# Patient Record
Sex: MALE | Race: OTHER | Employment: UNEMPLOYED | URBAN - METROPOLITAN AREA
[De-identification: names, ages, dates, MRNs, and addresses within clinical notes are randomized per-mention and may not be internally consistent; named-entity substitution may affect disease eponyms.]

---

## 2024-08-28 ENCOUNTER — APPOINTMENT (EMERGENCY)
Dept: CT IMAGING | Facility: HOSPITAL | Age: 44
End: 2024-08-28

## 2024-08-28 ENCOUNTER — HOSPITAL ENCOUNTER (EMERGENCY)
Facility: HOSPITAL | Age: 44
Discharge: HOME | End: 2024-08-28
Attending: EMERGENCY MEDICINE

## 2024-08-28 VITALS
HEART RATE: 75 BPM | DIASTOLIC BLOOD PRESSURE: 57 MMHG | RESPIRATION RATE: 18 BRPM | SYSTOLIC BLOOD PRESSURE: 107 MMHG | TEMPERATURE: 97.9 F | OXYGEN SATURATION: 99 %

## 2024-08-28 DIAGNOSIS — N20.1 LEFT URETERAL STONE: Primary | ICD-10-CM

## 2024-08-28 LAB
ALBUMIN SERPL BCG-MCNC: 4.5 G/DL (ref 3.5–5)
ALP SERPL-CCNC: 72 U/L (ref 34–104)
ALT SERPL W P-5'-P-CCNC: 45 U/L (ref 7–52)
AMORPH URATE CRY URNS QL MICRO: ABNORMAL /HPF
ANION GAP SERPL CALCULATED.3IONS-SCNC: 13 MMOL/L (ref 4–13)
AST SERPL W P-5'-P-CCNC: 26 U/L (ref 13–39)
BACTERIA UR QL AUTO: ABNORMAL /HPF
BASOPHILS # BLD AUTO: 0.04 THOUSANDS/ÂΜL (ref 0–0.1)
BASOPHILS NFR BLD AUTO: 1 % (ref 0–1)
BILIRUB SERPL-MCNC: 1.04 MG/DL (ref 0.2–1)
BILIRUB UR QL STRIP: ABNORMAL
BUN SERPL-MCNC: 20 MG/DL (ref 5–25)
CALCIUM SERPL-MCNC: 9.9 MG/DL (ref 8.4–10.2)
CAOX CRY URNS QL MICRO: ABNORMAL /HPF
CHLORIDE SERPL-SCNC: 104 MMOL/L (ref 96–108)
CLARITY UR: ABNORMAL
CO2 SERPL-SCNC: 25 MMOL/L (ref 21–32)
COLOR UR: ABNORMAL
CREAT SERPL-MCNC: 1.27 MG/DL (ref 0.6–1.3)
EOSINOPHIL # BLD AUTO: 0.03 THOUSAND/ÂΜL (ref 0–0.61)
EOSINOPHIL NFR BLD AUTO: 0 % (ref 0–6)
ERYTHROCYTE [DISTWIDTH] IN BLOOD BY AUTOMATED COUNT: 12.6 % (ref 11.6–15.1)
GFR SERPL CREATININE-BSD FRML MDRD: 67 ML/MIN/1.73SQ M
GLUCOSE SERPL-MCNC: 121 MG/DL (ref 65–140)
GLUCOSE UR STRIP-MCNC: NEGATIVE MG/DL
HCT VFR BLD AUTO: 49.1 % (ref 36.5–49.3)
HGB BLD-MCNC: 17.4 G/DL (ref 12–17)
HGB UR QL STRIP.AUTO: ABNORMAL
IMM GRANULOCYTES # BLD AUTO: 0.03 THOUSAND/UL (ref 0–0.2)
IMM GRANULOCYTES NFR BLD AUTO: 0 % (ref 0–2)
KETONES UR STRIP-MCNC: ABNORMAL MG/DL
LEUKOCYTE ESTERASE UR QL STRIP: NEGATIVE
LIPASE SERPL-CCNC: 10 U/L (ref 11–82)
LYMPHOCYTES # BLD AUTO: 2.48 THOUSANDS/ÂΜL (ref 0.6–4.47)
LYMPHOCYTES NFR BLD AUTO: 31 % (ref 14–44)
MCH RBC QN AUTO: 33 PG (ref 26.8–34.3)
MCHC RBC AUTO-ENTMCNC: 35.4 G/DL (ref 31.4–37.4)
MCV RBC AUTO: 93 FL (ref 82–98)
MONOCYTES # BLD AUTO: 0.53 THOUSAND/ÂΜL (ref 0.17–1.22)
MONOCYTES NFR BLD AUTO: 7 % (ref 4–12)
MUCOUS THREADS UR QL AUTO: ABNORMAL
NEUTROPHILS # BLD AUTO: 5 THOUSANDS/ÂΜL (ref 1.85–7.62)
NEUTS SEG NFR BLD AUTO: 61 % (ref 43–75)
NITRITE UR QL STRIP: NEGATIVE
NON-SQ EPI CELLS URNS QL MICRO: ABNORMAL /HPF
NRBC BLD AUTO-RTO: 0 /100 WBCS
PH UR STRIP.AUTO: 6 [PH]
PLATELET # BLD AUTO: 187 THOUSANDS/UL (ref 149–390)
PMV BLD AUTO: 10.6 FL (ref 8.9–12.7)
POTASSIUM SERPL-SCNC: 3.8 MMOL/L (ref 3.5–5.3)
PROT SERPL-MCNC: 7.9 G/DL (ref 6.4–8.4)
PROT UR STRIP-MCNC: ABNORMAL MG/DL
RBC # BLD AUTO: 5.28 MILLION/UL (ref 3.88–5.62)
RBC #/AREA URNS AUTO: ABNORMAL /HPF
SODIUM SERPL-SCNC: 142 MMOL/L (ref 135–147)
SP GR UR STRIP.AUTO: >=1.03 (ref 1–1.03)
UROBILINOGEN UR QL STRIP.AUTO: 0.2 E.U./DL
WBC # BLD AUTO: 8.11 THOUSAND/UL (ref 4.31–10.16)
WBC #/AREA URNS AUTO: ABNORMAL /HPF

## 2024-08-28 PROCEDURE — 80053 COMPREHEN METABOLIC PANEL: CPT

## 2024-08-28 PROCEDURE — 99285 EMERGENCY DEPT VISIT HI MDM: CPT | Performed by: EMERGENCY MEDICINE

## 2024-08-28 PROCEDURE — 96361 HYDRATE IV INFUSION ADD-ON: CPT

## 2024-08-28 PROCEDURE — 85025 COMPLETE CBC W/AUTO DIFF WBC: CPT

## 2024-08-28 PROCEDURE — 83690 ASSAY OF LIPASE: CPT

## 2024-08-28 PROCEDURE — 81001 URINALYSIS AUTO W/SCOPE: CPT | Performed by: EMERGENCY MEDICINE

## 2024-08-28 PROCEDURE — 96375 TX/PRO/DX INJ NEW DRUG ADDON: CPT

## 2024-08-28 PROCEDURE — 74176 CT ABD & PELVIS W/O CONTRAST: CPT

## 2024-08-28 PROCEDURE — 99284 EMERGENCY DEPT VISIT MOD MDM: CPT

## 2024-08-28 PROCEDURE — 36415 COLL VENOUS BLD VENIPUNCTURE: CPT

## 2024-08-28 PROCEDURE — 93005 ELECTROCARDIOGRAM TRACING: CPT

## 2024-08-28 PROCEDURE — 87086 URINE CULTURE/COLONY COUNT: CPT | Performed by: EMERGENCY MEDICINE

## 2024-08-28 PROCEDURE — 81003 URINALYSIS AUTO W/O SCOPE: CPT | Performed by: EMERGENCY MEDICINE

## 2024-08-28 PROCEDURE — 96374 THER/PROPH/DIAG INJ IV PUSH: CPT

## 2024-08-28 RX ORDER — OXYCODONE HYDROCHLORIDE 5 MG/1
5 TABLET ORAL EVERY 6 HOURS PRN
Qty: 8 TABLET | Refills: 0 | Status: SHIPPED | OUTPATIENT
Start: 2024-08-28 | End: 2024-08-31

## 2024-08-28 RX ORDER — MORPHINE SULFATE 4 MG/ML
4 INJECTION, SOLUTION INTRAMUSCULAR; INTRAVENOUS ONCE
Status: COMPLETED | OUTPATIENT
Start: 2024-08-28 | End: 2024-08-28

## 2024-08-28 RX ORDER — TAMSULOSIN HYDROCHLORIDE 0.4 MG/1
0.4 CAPSULE ORAL DAILY
Qty: 5 CAPSULE | Refills: 0 | Status: SHIPPED | OUTPATIENT
Start: 2024-08-29 | End: 2024-09-03

## 2024-08-28 RX ORDER — KETOROLAC TROMETHAMINE 30 MG/ML
30 INJECTION, SOLUTION INTRAMUSCULAR; INTRAVENOUS ONCE
Status: COMPLETED | OUTPATIENT
Start: 2024-08-28 | End: 2024-08-28

## 2024-08-28 RX ORDER — TAMSULOSIN HYDROCHLORIDE 0.4 MG/1
0.4 CAPSULE ORAL ONCE
Status: COMPLETED | OUTPATIENT
Start: 2024-08-28 | End: 2024-08-28

## 2024-08-28 RX ORDER — ONDANSETRON 4 MG/1
4 TABLET, ORALLY DISINTEGRATING ORAL ONCE
Status: COMPLETED | OUTPATIENT
Start: 2024-08-28 | End: 2024-08-28

## 2024-08-28 RX ADMIN — ONDANSETRON 4 MG: 4 TABLET, ORALLY DISINTEGRATING ORAL at 19:17

## 2024-08-28 RX ADMIN — MORPHINE SULFATE 4 MG: 4 INJECTION INTRAVENOUS at 19:04

## 2024-08-28 RX ADMIN — SODIUM CHLORIDE 1000 ML: 0.9 INJECTION, SOLUTION INTRAVENOUS at 19:04

## 2024-08-28 RX ADMIN — TAMSULOSIN HYDROCHLORIDE 0.4 MG: 0.4 CAPSULE ORAL at 21:50

## 2024-08-28 RX ADMIN — KETOROLAC TROMETHAMINE 30 MG: 30 INJECTION, SOLUTION INTRAMUSCULAR at 21:15

## 2024-08-28 NOTE — ED PROVIDER NOTES
History  Chief Complaint   Patient presents with    Abdominal Pain     Pt complains of sharp pain in left side that started three hours ago. Complains of nausea and vomiting.      45 yo M presents to the ED for left- sided abdominal pain that is non-radiating with a sudden onset 3 hours prior to arrival.  He states the pain pain in nature and has been constant.  He states to have increased sweating, vomited 4 times and is nauseous.  He denied fevers, chills, diarrhea, constipation, incontinence. Pertinent hx includes a kidney stone last year that resolved with pain medication.  used to obtain history and physical exam.       History provided by:  Patient   used: Yes    Abdominal Pain  Pain location: left side abd pain.  Pain radiates to:  Does not radiate  Pain severity:  Severe  Onset quality:  Sudden  Duration:  3 hours  Timing:  Constant  Progression:  Worsening  Chronicity:  New  Relieved by:  Nothing  Associated symptoms: nausea and vomiting    Associated symptoms: no chest pain, no chills, no constipation, no diarrhea, no dysuria, no fatigue, no fever and no shortness of breath        None       History reviewed. No pertinent past medical history.    History reviewed. No pertinent surgical history.    History reviewed. No pertinent family history.  I have reviewed and agree with the history as documented.    E-Cigarette/Vaping     E-Cigarette/Vaping Substances     Social History     Tobacco Use    Smoking status: Never    Smokeless tobacco: Never   Substance Use Topics    Alcohol use: Never    Drug use: Never        Review of Systems   Constitutional:  Positive for diaphoresis. Negative for chills, fatigue and fever.   Respiratory:  Negative for shortness of breath.    Cardiovascular:  Negative for chest pain and palpitations.   Gastrointestinal:  Positive for abdominal pain, nausea and vomiting. Negative for constipation and diarrhea.   Genitourinary:  Negative for  dysuria.   Musculoskeletal:  Negative for back pain.   Neurological:  Negative for dizziness, light-headedness and numbness.       Physical Exam  ED Triage Vitals [08/28/24 1838]   Temperature Pulse Respirations Blood Pressure SpO2   97.9 °F (36.6 °C) 66 20 139/97 99 %      Temp Source Heart Rate Source Patient Position - Orthostatic VS BP Location FiO2 (%)   Oral Monitor Lying Right arm --      Pain Score       8             Orthostatic Vital Signs  Vitals:    08/28/24 1838 08/28/24 2241   BP: 139/97 107/57   Pulse: 66 75   Patient Position - Orthostatic VS: Lying Lying       Physical Exam  Vitals and nursing note reviewed.   Constitutional:       General: He is in acute distress.      Appearance: He is well-developed. He is not ill-appearing or toxic-appearing.   HENT:      Head: Normocephalic and atraumatic.      Mouth/Throat:      Mouth: Mucous membranes are moist.   Eyes:      Extraocular Movements: Extraocular movements intact.      Pupils: Pupils are equal, round, and reactive to light.   Cardiovascular:      Rate and Rhythm: Normal rate and regular rhythm.      Heart sounds: Normal heart sounds.   Pulmonary:      Effort: Pulmonary effort is normal.      Breath sounds: Normal breath sounds. No wheezing.   Abdominal:      General: Abdomen is flat. There is no distension.      Tenderness: There is abdominal tenderness. There is no right CVA tenderness or left CVA tenderness.       Musculoskeletal:         General: No swelling.      Right lower leg: No edema.      Left lower leg: No edema.   Skin:     General: Skin is warm and dry.      Capillary Refill: Capillary refill takes less than 2 seconds.      Coloration: Skin is not jaundiced.   Neurological:      Mental Status: He is alert.         ED Medications  Medications   morphine injection 4 mg (4 mg Intravenous Given 8/28/24 1904)   sodium chloride 0.9 % bolus 1,000 mL (0 mL Intravenous Stopped 8/28/24 2115)   ondansetron (ZOFRAN-ODT) dispersible tablet 4 mg  (4 mg Oral Given 8/28/24 1917)   ketorolac (TORADOL) injection 30 mg (30 mg Intravenous Given 8/28/24 2115)   tamsulosin (FLOMAX) capsule 0.4 mg (0.4 mg Oral Given 8/28/24 2150)       Diagnostic Studies  Results Reviewed       Procedure Component Value Units Date/Time    UA (URINE) with reflex to Scope [[id number]]  (Abnormal) Collected: 08/28/24 2234    Lab Status: Final result Specimen: Urine, Clean Catch Updated: 08/28/24 2245     Color, UA Margaret     Clarity, UA Cloudy     Specific Gravity, UA >=1.030     pH, UA 6.0     Leukocytes, UA Negative     Nitrite, UA Negative     Protein, UA 2+ mg/dl      Glucose, UA Negative mg/dl      Ketones, UA 40 (2+) mg/dl      Urobilinogen, UA 0.2 E.U./dl      Bilirubin, UA 1+     Occult Blood, UA 3+    Urine Microscopic [[id number]] Collected: 08/28/24 2234    Lab Status: In process Specimen: Urine, Clean Catch Updated: 08/28/24 2245    Urine culture [[id number]] Collected: 08/28/24 2234    Lab Status: In process Specimen: Urine, Clean Catch Updated: 08/28/24 2239    Lipase [[id number]]  (Abnormal) Collected: 08/28/24 1857    Lab Status: Final result Specimen: Blood from Arm, Left Updated: 08/28/24 1932     Lipase 10 u/L     Comprehensive metabolic panel [[id number]]  (Abnormal) Collected: 08/28/24 1857    Lab Status: Final result Specimen: Blood from Arm, Left Updated: 08/28/24 1932     Sodium 142 mmol/L      Potassium 3.8 mmol/L      Chloride 104 mmol/L      CO2 25 mmol/L      ANION GAP 13 mmol/L      BUN 20 mg/dL      Creatinine 1.27 mg/dL      Glucose 121 mg/dL      Calcium 9.9 mg/dL      AST 26 U/L      ALT 45 U/L      Alkaline Phosphatase 72 U/L      Total Protein 7.9 g/dL      Albumin 4.5 g/dL      Total Bilirubin 1.04 mg/dL      eGFR 67 ml/min/1.73sq m     Narrative:      National Kidney Disease Foundation guidelines for Chronic Kidney Disease (CKD):     Stage 1 with normal or high GFR (GFR > 90 mL/min/1.73 square meters)    Stage 2 Mild CKD (GFR = 60-89 mL/min/1.73 square  meters)    Stage 3A Moderate CKD (GFR = 45-59 mL/min/1.73 square meters)    Stage 3B Moderate CKD (GFR = 30-44 mL/min/1.73 square meters)    Stage 4 Severe CKD (GFR = 15-29 mL/min/1.73 square meters)    Stage 5 End Stage CKD (GFR <15 mL/min/1.73 square meters)  Note: GFR calculation is accurate only with a steady state creatinine    CBC and differential [[id number]]  (Abnormal) Collected: 08/28/24 1857    Lab Status: Final result Specimen: Blood from Arm, Left Updated: 08/28/24 1910     WBC 8.11 Thousand/uL      RBC 5.28 Million/uL      Hemoglobin 17.4 g/dL      Hematocrit 49.1 %      MCV 93 fL      MCH 33.0 pg      MCHC 35.4 g/dL      RDW 12.6 %      MPV 10.6 fL      Platelets 187 Thousands/uL      nRBC 0 /100 WBCs      Segmented % 61 %      Immature Grans % 0 %      Lymphocytes % 31 %      Monocytes % 7 %      Eosinophils Relative 0 %      Basophils Relative 1 %      Absolute Neutrophils 5.00 Thousands/µL      Absolute Immature Grans 0.03 Thousand/uL      Absolute Lymphocytes 2.48 Thousands/µL      Absolute Monocytes 0.53 Thousand/µL      Eosinophils Absolute 0.03 Thousand/µL      Basophils Absolute 0.04 Thousands/µL                    CT renal stone study abdomen pelvis without contrast   Final Result by Liam Ugalde MD (08/28 2017)      5 mm calculus at the left UVJ causing mild hydronephrosis.      Bilateral subcentimeter nonobstructing intrarenal calculi.         Workstation performed: [id number]               Procedures  ECG 12 Lead Documentation Only    Date/Time: 8/28/2024 7:14 PM    Performed by: Thalia Trejo DO  Authorized by: Thalia Trejo DO    ECG reviewed by me, the ED Provider: yes    Patient location:  ED  Interpretation:     Interpretation: normal    Rate:     ECG rate assessment: normal    Rhythm:     Rhythm: sinus rhythm          ED Course       SBIRT 22yo+      Flowsheet Row Most Recent Value   Initial Alcohol Screen: US AUDIT-C     1. How often do you have a drink containing alcohol? 0  Filed at: 08/28/2024 1837   2. How many drinks containing alcohol do you have on a typical day you are drinking?  0 Filed at: 08/28/2024 1837   3a. Male UNDER 65: How often do you have five or more drinks on one occasion? 0 Filed at: 08/28/2024 1837   Audit-C Score 0 Filed at: 08/28/2024 1837   SARAI: How many times in the past year have you...    Used an illegal drug or used a prescription medication for non-medical reasons? Never Filed at: 08/28/2024 1837                  Medical Decision Making  43 yo M presents to the ED for left- sided abdominal pain that is non-radiating with a sudden onset 3 hours prior to arrival.  Differential dx include nephrolithiasis vs pyelonephritis vs intestinal obstruction.  ED work up included EKG that was NSR 69, labs, CT abd/pelv showed 5 mm calculus at the left UVJ causing mild hydronephrosis and bilateral subcentimeter nonobstructing intrarenal calculi. UA was not concerning for UTI.  Pt was provided w/ Morphine and Toradol for pain control, zofran, bolus of fluids, along with one dose of flomax.  He is feeling much better and is medically stable for discharge.  Provided pt with ambulatory referral to urology, prescription of flomax and roxicodone for pain control.         Amount and/or Complexity of Data Reviewed  Labs: ordered. Decision-making details documented in ED Course.  Radiology: ordered and independent interpretation performed. Decision-making details documented in ED Course.  ECG/medicine tests: ordered and independent interpretation performed. Decision-making details documented in ED Course.    Risk  Prescription drug management.      Disposition  Final diagnoses:   Left ureteral stone     Time reflects when diagnosis was documented in both MDM as applicable and the Disposition within this note       Time User Action Codes Description Comment    8/28/2024  8:58 PM Thalia Trejo Add [N20.0] Nephrolithiasis     8/28/2024  9:37 PM Eddie Rubio Add [N20.1] Left  ureteral stone     8/28/2024  9:37 PM Eddie Rubio Modify [N20.1] Left ureteral stone     8/28/2024  9:37 PM Eddie Rubio Remove [N20.0] Nephrolithiasis           ED Disposition       ED Disposition   Discharge    Condition   Stable    Date/Time   Wed Aug 28, 2024 2100    Comment   Daniel Wells discharge to home/self care.                   Follow-up Information       Follow up With Specialties Details Why Contact Info Additional Information    [facility] [place] [facility] Call in 1 day  [address] [facility] [address]  [facility] [address] [phone] [facility] [place], [address] [place] [address], [address] [phone]    Your primary doctor  Call in 1 day       [facility] Call  As needed [address] [facility] [address] [phone] [facility], [address] [place] [address], [address] [phone]            Patient's Medications   Discharge Prescriptions    OXYCODONE (ROXICODONE) 5 IMMEDIATE RELEASE TABLET    Take 1 tablet (5 mg total) by mouth every 6 (six) hours as needed for moderate pain or severe pain for up to 3 days Max Daily Amount: 20 mg       Start Date: 8/28/2024 End Date: 8/31/2024       Order Dose: 5 mg       Quantity: 8 tablet    Refills: 0    TAMSULOSIN (FLOMAX) 0.4 MG    Take 1 capsule (0.4 mg total) by mouth in the morning for 5 days Do not start before August 29, 2024.       Start Date: 8/29/2024 End Date: 9/3/2024       Order Dose: 0.4 mg       Quantity: 5 capsule    Refills: 0         PDMP Review         Value Time User    PDMP Reviewed  Yes 8/28/2024  9:58 PM Eddie Rubio MD           ED Provider  Attending physically available and evaluated Daniel OrtezJonnkarlos. I managed the patient along with the ED Attending.    Electronically Signed by     Thalia Trejo DO  08/28/24 2110       Thalia Trejo DO  08/28/24 2118       Thalia  Neil,   08/28/24 2302       Thalia Trejo,   08/28/24 2304       Thalia Trejo,   08/28/24 2304

## 2024-08-29 NOTE — DISCHARGE INSTRUCTIONS
Follow up with urology/outpatient providers, and return to the emergency department for new or worsening symptoms.      CT ABDOMEN AND PELVIS WITHOUT IV CONTRAST - LOW DOSE RENAL STONE     INDICATION: abd pain.     COMPARISON: None.     TECHNIQUE: Low radiation dose thin section CT examination of the abdomen and pelvis was performed without intravenous or oral contrast according to a protocol specifically designed to evaluate for urinary tract calculus. Axial, sagittal, and coronal 2D   reformatted images were created from the source data and submitted for interpretation. Evaluation for pathology in the abdomen and pelvis that is unrelated to urinary tract calculi is limited.     Radiation dose length product (DLP) for this visit: 197.3 mGy-cm . This examination, like all CT scans performed in the [facility], was performed utilizing techniques to minimize radiation dose exposure, including the use of iterative   reconstruction and automated exposure control.     URINARY TRACT FINDINGS:     RIGHT KIDNEY AND URETER: There is a 2 mm nonobstructing lower pole calculus. No hydronephrosis or hydroureter.     LEFT KIDNEY AND URETER: There is mild hydronephrosis and hydroureter, the cause of which appears to be a 5 mm calculus at the UVJ. There are multiple nonobstructing intrarenal calculi measuring up to 3 mm.     URINARY BLADDER: Unremarkable.        ADDITIONAL FINDINGS:     LOWER CHEST: No clinically significant abnormality in the visualized lower chest.     SOLID VISCERA: Hepatic steatosis. Otherwise, limited low radiation dose CT demonstrates no clinically significant abnormality of visualized solid abdominal viscera.     GALLBLADDER/BILIARY TREE: No calcified gallstones. No pericholecystic inflammatory change. No biliary dilation.     STOMACH AND BOWEL: Unremarkable.     APPENDIX: No findings to suggest appendicitis.     ABDOMINOPELVIC CAVITY: No ascites. No pneumoperitoneum. No lymphadenopathy.      VESSELS: Unremarkable for patient's age.     REPRODUCTIVE ORGANS: Unremarkable for patient's age.     ABDOMINAL WALL/INGUINAL REGIONS: Small fat-containing umbilical hernia.     BONES: No acute fracture or suspicious osseous lesion.     IMPRESSION:     5 mm calculus at the left UVJ causing mild hydronephrosis.

## 2024-08-29 NOTE — ED ATTENDING ATTESTATION
8/28/2024  I, Eddie Rasheed DO, saw and evaluated the patient. I have discussed the patient with the resident/non-physician practitioner and agree with the resident's/non-physician practitioner's findings, Plan of Care, and MDM as documented in the resident's/non-physician practitioner's note, except where noted. All available labs and Radiology studies were reviewed.  I was present for key portions of any procedure(s) performed by the resident/non-physician practitioner and I was immediately available to provide assistance.       At this point I agree with the current assessment done in the Emergency Department.  I have conducted an independent evaluation of this patient a history and physical is as follows:  44 year old male who presented to the ED with abdominal pain. Severe. Feels similar to prior kidney stone. No abdominal pain or flank tenderness. Appears very uncomfortable. Had a CT with a UVJ stone. On re-eval appears much more comfortable. Likely passed stone. Labs unremarkable. Will dc with follow up to urology.   ED Course         Critical Care Time  ECG 12 Lead Documentation Only    Date/Time: 8/28/2024 9:04 PM    Performed by: Eddie Rasheed DO  Authorized by: Eddie Rasheed DO    ECG reviewed by me, the ED Provider: yes    Patient location:  ED  Comments:      Normal sinus rhythm, rate of 69, normal NM, normal QTc, no STEMI, no prior EKG, EKG independently interpreted by me

## 2024-08-29 NOTE — ED CARE HANDOFF
Emergency Department Sign Out Note        Signout from my colleague, Dr. Trejo. See Separate Emergency Department note.     The patient, Daniel Wells, was evaluated for flank pain.    Labs Reviewed   COMPREHENSIVE METABOLIC PANEL - Abnormal       Result Value Ref Range Status    Sodium 142  135 - 147 mmol/L Final    Potassium 3.8  3.5 - 5.3 mmol/L Final    Chloride 104  96 - 108 mmol/L Final    CO2 25  21 - 32 mmol/L Final    ANION GAP 13  4 - 13 mmol/L Final    BUN 20  5 - 25 mg/dL Final    Creatinine 1.27  0.60 - 1.30 mg/dL Final    Comment: Standardized to IDMS reference method    Glucose 121  65 - 140 mg/dL Final    Comment: If the patient is fasting, the ADA then defines impaired fasting glucose as > 100 mg/dL and diabetes as > or equal to 123 mg/dL.    Calcium 9.9  8.4 - 10.2 mg/dL Final    AST 26  13 - 39 U/L Final    ALT 45  7 - 52 U/L Final    Comment: Specimen collection should occur prior to Sulfasalazine administration due to the potential for falsely depressed results.     Alkaline Phosphatase 72  34 - 104 U/L Final    Total Protein 7.9  6.4 - 8.4 g/dL Final    Albumin 4.5  3.5 - 5.0 g/dL Final    Total Bilirubin 1.04 (*) 0.20 - 1.00 mg/dL Final    Comment: Use of this assay is not recommended for patients undergoing treatment with eltrombopag due to the potential for falsely elevated results.  N-acetyl-p-benzoquinone imine (metabolite of Acetaminophen) will generate erroneously low results in samples for patients that have taken an overdose of Acetaminophen.    eGFR 67  ml/min/1.73sq m Final    Narrative:     National Kidney Disease Foundation guidelines for Chronic Kidney Disease (CKD):     Stage 1 with normal or high GFR (GFR > 90 mL/min/1.73 square meters)    Stage 2 Mild CKD (GFR = 60-89 mL/min/1.73 square meters)    Stage 3A Moderate CKD (GFR = 45-59 mL/min/1.73 square meters)    Stage 3B Moderate CKD (GFR = 30-44 mL/min/1.73 square meters)    Stage 4 Severe CKD (GFR = 15-29 mL/min/1.73  square meters)    Stage 5 End Stage CKD (GFR <15 mL/min/1.73 square meters)  Note: GFR calculation is accurate only with a steady state creatinine   LIPASE - Abnormal    Lipase 10 (*) 11 - 82 u/L Final   CBC AND DIFFERENTIAL - Abnormal    WBC 8.11  4.31 - 10.16 Thousand/uL Final    RBC 5.28  3.88 - 5.62 Million/uL Final    Hemoglobin 17.4 (*) 12.0 - 17.0 g/dL Final    Hematocrit 49.1  36.5 - 49.3 % Final    MCV 93  82 - 98 fL Final    MCH 33.0  26.8 - 34.3 pg Final    MCHC 35.4  31.4 - 37.4 g/dL Final    RDW 12.6  11.6 - 15.1 % Final    MPV 10.6  8.9 - 12.7 fL Final    Platelets 187  149 - 390 Thousands/uL Final    nRBC 0  /100 WBCs Final    Segmented % 61  43 - 75 % Final    Immature Grans % 0  0 - 2 % Final    Lymphocytes % 31  14 - 44 % Final    Monocytes % 7  4 - 12 % Final    Eosinophils Relative 0  0 - 6 % Final    Basophils Relative 1  0 - 1 % Final    Absolute Neutrophils 5.00  1.85 - 7.62 Thousands/µL Final    Absolute Immature Grans 0.03  0.00 - 0.20 Thousand/uL Final    Absolute Lymphocytes 2.48  0.60 - 4.47 Thousands/µL Final    Absolute Monocytes 0.53  0.17 - 1.22 Thousand/µL Final    Eosinophils Absolute 0.03  0.00 - 0.61 Thousand/µL Final    Basophils Absolute 0.04  0.00 - 0.10 Thousands/µL Final   URINALYSIS WITH REFLEX TO SCOPE - Abnormal    Color, UA Margaret (*) Yellow Final    Clarity, UA Cloudy (*) Clear Final    Specific Gravity, UA >=1.030  1.001 - 1.030 Final    pH, UA 6.0  5.0, 5.5, 6.0, 6.5, 7.0, 7.5, 8.0 Final    Leukocytes, UA Negative  Negative Final    Nitrite, UA Negative  Negative Final    Protein, UA 2+ (*) Negative, Interference- unable to analyze mg/dl Final    Glucose, UA Negative  Negative mg/dl Final    Ketones, UA 40 (2+) (*) Negative mg/dl Final    Urobilinogen, UA 0.2  0.2, 1.0 E.U./dl E.U./dl Final    Bilirubin, UA 1+ (*) Negative Final    Occult Blood, UA 3+ (*) Negative Final   URINE CULTURE   URINE MICROSCOPIC         Urinalysis is pending.  Urinalysis remarkable for 2+  protein, 2+ ketones, 1+ bilirubin, 3+ occult blood, not consistent with urinary tract infection.   Plan to treat patient with course of Flomax and medication as needed for pain control, and have patient follow up with urology/outpatient providers.                      ED Course as of 08/28/24 2256   Wed Aug 28, 2024   2202 CT abdomen/pelvis-    IMPRESSION:     5 mm calculus at the left UVJ causing mild hydronephrosis.     Bilateral subcentimeter nonobstructing intrarenal calculi.          Procedures  Medical Decision Making  Amount and/or Complexity of Data Reviewed  Labs: ordered.  Radiology: ordered.    Risk  Prescription drug management.            Disposition  Final diagnoses:   Left ureteral stone     Time reflects when diagnosis was documented in both MDM as applicable and the Disposition within this note       Time User Action Codes Description Comment    8/28/2024  8:58 PM Thalia Trejo Add [N20.0] Nephrolithiasis     8/28/2024  9:37 PM Eddie Rubio Add [N20.1] Left ureteral stone     8/28/2024  9:37 PM Eddie Rubio Modify [N20.1] Left ureteral stone     8/28/2024  9:37 PM Eddie Rubio Remove [N20.0] Nephrolithiasis           ED Disposition       ED Disposition   Discharge    Condition   Stable    Date/Time   Wed Aug 28, 2024  9:00 PM    Comment   Daniel Wells discharge to home/self care.                   Follow-up Information       Follow up With Specialties Details Why Contact Info Additional Information    [facility] [place] [facility] Call in 1 day  [address]  [facility] [address] [phone] [facility] [place], [address], [place], [address] [phone]    Your primary doctor  Call in 1 day       [facility] Call  As needed [address] [facility] [address] [phone] [facility], [address] [place] [address],  [address]   [phone]          Patient's Medications   Discharge Prescriptions    OXYCODONE (ROXICODONE) 5 IMMEDIATE RELEASE TABLET    Take 1 tablet (5 mg total) by mouth every 6 (six) hours as needed for moderate pain or severe pain for up to 3 days Max Daily Amount: 20 mg       Start Date: 8/28/2024 End Date: 8/31/2024       Order Dose: 5 mg       Quantity: 8 tablet    Refills: 0    TAMSULOSIN (FLOMAX) 0.4 MG    Take 1 capsule (0.4 mg total) by mouth in the morning for 5 days Do not start before August 29, 2024.       Start Date: 8/29/2024 End Date: 9/3/2024       Order Dose: 0.4 mg       Quantity: 5 capsule    Refills: 0            ED Provider  Electronically Signed by     Eddie Rubio MD  08/28/24 6259       Eddie Rubio MD  08/28/24 4321

## 2024-08-30 LAB
ATRIAL RATE: 69 BPM
BACTERIA UR CULT: NORMAL
P AXIS: 64 DEGREES
PR INTERVAL: 118 MS
QRS AXIS: 81 DEGREES
QRSD INTERVAL: 78 MS
QT INTERVAL: 426 MS
QTC INTERVAL: 456 MS
T WAVE AXIS: 73 DEGREES
VENTRICULAR RATE: 69 BPM